# Patient Record
Sex: MALE | Race: WHITE | NOT HISPANIC OR LATINO | Employment: FULL TIME | URBAN - METROPOLITAN AREA
[De-identification: names, ages, dates, MRNs, and addresses within clinical notes are randomized per-mention and may not be internally consistent; named-entity substitution may affect disease eponyms.]

---

## 2024-04-11 LAB — HBA1C MFR BLD HPLC: 4.7 %

## 2024-04-18 LAB — HCV AB SER-ACNC: NORMAL

## 2024-10-24 ENCOUNTER — OFFICE VISIT (OUTPATIENT)
Dept: GASTROENTEROLOGY | Facility: CLINIC | Age: 49
End: 2024-10-24
Payer: COMMERCIAL

## 2024-10-24 ENCOUNTER — TELEPHONE (OUTPATIENT)
Dept: GASTROENTEROLOGY | Facility: CLINIC | Age: 49
End: 2024-10-24

## 2024-10-24 VITALS
WEIGHT: 263.8 LBS | SYSTOLIC BLOOD PRESSURE: 134 MMHG | TEMPERATURE: 98.1 F | OXYGEN SATURATION: 97 % | HEART RATE: 80 BPM | DIASTOLIC BLOOD PRESSURE: 85 MMHG | HEIGHT: 70 IN | BODY MASS INDEX: 37.77 KG/M2

## 2024-10-24 DIAGNOSIS — Z12.11 SCREENING FOR COLON CANCER: ICD-10-CM

## 2024-10-24 DIAGNOSIS — B25.9 CMV INFECTION, ACUTE (HCC): ICD-10-CM

## 2024-10-24 DIAGNOSIS — R79.89 ELEVATED LFTS: Primary | ICD-10-CM

## 2024-10-24 PROCEDURE — 99203 OFFICE O/P NEW LOW 30 MIN: CPT | Performed by: INTERNAL MEDICINE

## 2024-10-24 RX ORDER — ALBUTEROL SULFATE 90 UG/1
2 INHALANT RESPIRATORY (INHALATION) EVERY 6 HOURS PRN
COMMUNITY

## 2024-10-24 RX ORDER — POLYETHYLENE GLYCOL 3350, SODIUM CHLORIDE, SODIUM BICARBONATE, POTASSIUM CHLORIDE 420; 11.2; 5.72; 1.48 G/4L; G/4L; G/4L; G/4L
4000 POWDER, FOR SOLUTION ORAL ONCE
Qty: 4000 ML | Refills: 0 | Status: SHIPPED | OUTPATIENT
Start: 2024-10-24 | End: 2024-10-24

## 2024-10-24 NOTE — TELEPHONE ENCOUNTER
[phone] was not the correct number, I efraín Ramirez and faxed the paper work to [phone]

## 2024-10-24 NOTE — TELEPHONE ENCOUNTER
Scheduled date of colonoscopy (as of today):12/10/24  Physician performing colonoscopy:Trevor  Location of colonoscopy:[facility]  Bowel prep reviewed with patient:Angie  Instructions reviewed with patient by:verbal and folder   Clearances: NONE

## 2024-10-24 NOTE — PROGRESS NOTES
[facility] - Outpatient Consultation  Mike Warner 48 y.o. male MRN: [id number]  Encounter: [id number]    PCP:  Ricki Ibanez DO, [phone]  Referring Provider:  No ref. provider found,         ASSESSMENT AND PLAN:      Elevated LFTs:  Hepatocellular with peak  and AST 58 on his most recent labs drawn 2 days ago.  He was found to have acute CMV infection in May when the LFTs were first noticed to be elevated and they have continued to fluctuate since that time.  Repeat CMV serology showed IgG positivity and IgM negativity.  CMV PCR still pending.  Etiology of his transaminase elevation is not quite clear, but the differential includes metabolic dysfunction associated fatty liver disease although his only metabolic risk factor is obesity with a BMI of 37.8.  I have requested serologic workup for underlying causes of transaminase elevation including viral and autoimmune hepatitis, hemochromatosis, celiac disease, Lv's disease and alpha-1 antitrypsin deficiency.  Fortunately, he has no physical or laboratory evidence of advanced liver disease, cirrhosis or portal hypertension.  He has not had abdominal imaging today and I have requested abdominal ultrasound and elastography for further evaluation.  We did discuss that if we do not find a clear cause of his elevated transaminases and they continue to be elevated and/or continue to rise, we may need to pursue a liver biopsy for histologic evaluation.    2.  Colon cancer screening:  Patient has not yet had CRC screening.  He denies a family history of colon cancer and no personal history of constipation.  BMI less than 40.  Will plan for colonoscopy for regular GoLytely prep at [facility] or [facility].    FOLLOW-UP:  Return in about 3 months (around 1/24/2025).    VISIT DIAGNOSES AND ORDERS:      1. Elevated LFTs    2. CMV infection, acute (HCC)    3. Screening for colon cancer      Orders Placed This Encounter    Procedures    US abdomen complete    US elastography/UGAP    Ceruloplasmin    Alpha-1-antitrypsin    Antimitochondrial antibody    Anti-smooth muscle antibody, IgG    DAVID Screen w/ Reflex to Titer/Pattern    Hepatitis A antibody, total    Hepatitis B surface antibody    Hepatitis panel, acute    Alpha 1 Antitrypsin Phenotype    enhanced liver fibrosis (elf) score    CBC    Comprehensive metabolic panel    Protime-INR    IgG    IgA    IgM    Celiac Disease Comprehensive Panel    Iron, TIBC and Ferritin Panel    Colonoscopy     ______________________________________________________________________    HPI:  Mr. Mike Warner is a 48 y.o. male with medical history of lumbar disc disease who is doing fairly well until May of this year when he was found to have acute CMV infection and elevated LFTs and hepatocellular pattern with transaminases 2-4 times the upper limit of normal.    Given acute CMV infection he was monitored with serial blood work and fortunately transaminases did not improve and in fact on his most recent draw 2 days ago his ALT continues to rise, currently 107.  AST currently 58.    Patient states several years ago he was found to have mildly elevated transaminases as part of his life insurance policy blood work.    Mr. Warner denies recent or history of yellow eyes/skin, dark urine, GI bleeding, abdominal distention with fluid, lower extremity swelling, easy bruising, excessive bleeding, pruritus or confusion.  he denies abdominal pain, nausea, vomiting, heartburn, reflux, difficulty swallowing, early satiety, bloating, diarrhea, constipation or straining with passing stools.    He states he occasionally takes an NSAID for back pain, drinks a glass of wine 2-3 times per week, and is otherwise fairly healthy.  He has gained weight recently as he has not been able to run due to his back injury.    REVIEW OF SYSTEMS:    Review of Systems per HPI      Historical Information   There is no problem list  "on file for this patient.    Past Medical History:   Diagnosis Date    Asthma     DJD (degenerative joint disease)     History of CMV      History reviewed. No pertinent surgical history.  Social History   Social History     Substance and Sexual Activity   Alcohol Use Yes    Alcohol/week: 3.0 standard drinks of alcohol    Types: 3 Glasses of wine per week     Social History     Substance and Sexual Activity   Drug Use Not on file     Social History     Tobacco Use   Smoking Status Never   Smokeless Tobacco Never     Family History   Problem Relation Age of Onset    Cancer Mother     No Known Problems Brother     Colon cancer Neg Hx     Colon polyps Neg Hx        Meds/Allergies       Current Outpatient Medications:     albuterol (PROVENTIL HFA,VENTOLIN HFA) 90 mcg/act inhaler    polyethylene glycol-electrolytes (TriLyte) 4000 mL solution    Allergies   Allergen Reactions    Aspirin Hives           Objective     Blood pressure 134/85, pulse 80, temperature 98.1 °F (36.7 °C), temperature source Oral, height 5' 10\" (1.778 m), weight 120 kg (263 lb 12.8 oz), SpO2 97%. Body mass index is 37.85 kg/m².        PHYSICAL EXAM:           Physical Exam  Vitals reviewed.   Constitutional:       General: He is not in acute distress.     Appearance: Normal appearance. He is obese. He is not ill-appearing.   HENT:      Head: Normocephalic and atraumatic.      Nose: Nose normal.      Mouth/Throat:      Pharynx: Oropharynx is clear. No posterior oropharyngeal erythema.   Eyes:      General: No scleral icterus.     Extraocular Movements: Extraocular movements intact.   Cardiovascular:      Rate and Rhythm: Normal rate and regular rhythm.      Heart sounds: No murmur heard.  Pulmonary:      Effort: Pulmonary effort is normal. No respiratory distress.      Breath sounds: Normal breath sounds. No rales.   Abdominal:      General: There is distension (Mild abdominal obesity).      Palpations: Abdomen is soft. There is no shifting dullness, " "fluid wave, hepatomegaly or splenomegaly.      Tenderness: There is no abdominal tenderness. There is no guarding.   Musculoskeletal:         General: No swelling. Normal range of motion.      Cervical back: Normal range of motion and neck supple.   Skin:     General: Skin is warm.      Coloration: Skin is not jaundiced.   Neurological:      General: No focal deficit present.      Mental Status: He is oriented to person, place, and time.   Psychiatric:         Mood and Affect: Mood normal.              Lab Results:   No results found for: \"SODIUM\", \"K\", \"BUN\", \"CREATININE\", \"MG\", \"TBILI\", \"ALKPHOS\", \"ALB\", \"AST\", \"ALT\", \"WBC\", \"PLT\", \"HGB\", \"PT\", \"INR\"  No results found for: \"AFP\"  Lab Results   Component Value Date    HEPCAB NON-REACTIVE 04/18/2024     Lab Results   Component Value Date    HGBA1C 4.7 04/11/2024       Radiology Results:   I have reviewed the results of any radiology studies performed within the last 90 days. This includes:      No results found.      Huseyin Pastrana MD  Hepatology/Gastroenterology  "

## 2024-11-04 ENCOUNTER — TELEPHONE (OUTPATIENT)
Age: 49
End: 2024-11-04

## 2024-11-04 NOTE — TELEPHONE ENCOUNTER
Spoke with pt states he did blood work today and the lab was able to figure the code out to complete the labs. Pt states everything was good.

## 2024-11-04 NOTE — TELEPHONE ENCOUNTER
Lab called in stating both diagnosis codes were not working for HEP A test. While on the line with a nurse to get another diagnosis code, Lab hung up. Please advise of any other diagnosis codes to use.

## 2024-11-12 LAB
A1AT PHENOTYP SERPL-IMP: NORMAL
A1AT SERPL-MCNC: 142 MG/DL (ref 83–199)
ALBUMIN SERPL-MCNC: 4.7 G/DL (ref 3.6–5.1)
ALBUMIN/GLOB SERPL: 1.9 (CALC) (ref 1–2.5)
ALP SERPL-CCNC: 72 U/L (ref 36–130)
ALT SERPL-CCNC: 92 U/L (ref 9–46)
ANA SER QL IF: NEGATIVE
AST SERPL-CCNC: 51 U/L (ref 10–40)
BILIRUB SERPL-MCNC: 0.6 MG/DL (ref 0.2–1.2)
BUN SERPL-MCNC: 19 MG/DL (ref 7–25)
BUN/CREAT SERPL: ABNORMAL (CALC) (ref 6–22)
CALCIUM SERPL-MCNC: 9 MG/DL (ref 8.6–10.3)
CERULOPLASMIN SERPL-MCNC: 22 MG/DL (ref 14–30)
CHLORIDE SERPL-SCNC: 104 MMOL/L (ref 98–110)
CO2 SERPL-SCNC: 26 MMOL/L (ref 20–32)
CREAT SERPL-MCNC: 0.81 MG/DL (ref 0.6–1.29)
ENHANCED LIVER FIBROSIS (ELF) SCORE: 9.48
ERYTHROCYTE [DISTWIDTH] IN BLOOD BY AUTOMATED COUNT: 13 % (ref 11–15)
FERRITIN SERPL-MCNC: 450 NG/ML (ref 38–380)
GFR/BSA.PRED SERPLBLD CYS-BASED-ARV: 110 ML/MIN/1.73M2
GLOBULIN SER CALC-MCNC: 2.5 G/DL (CALC) (ref 1.9–3.7)
GLUCOSE SERPL-MCNC: 95 MG/DL (ref 65–99)
HAV AB SER QL IA: NORMAL
HAV IGM SERPL QL IA: NORMAL
HBV CORE IGM SERPL QL IA: NORMAL
HBV SURFACE AB SERPL IA-ACNC: <5 MIU/ML
HBV SURFACE AG SERPL QL IA: NORMAL
HCT VFR BLD AUTO: 46.9 % (ref 38.5–50)
HCV AB SERPL QL IA: NORMAL
HGB BLD-MCNC: 16 G/DL (ref 13.2–17.1)
IGA SERPL-MCNC: 233 MG/DL (ref 47–310)
IGG SERPL-MCNC: 1249 MG/DL (ref 600–1640)
IGM SERPL-MCNC: 89 MG/DL (ref 50–300)
INR PPP: 1
IRON SATN MFR SERPL: 31 % (CALC) (ref 20–48)
IRON SERPL-MCNC: 106 MCG/DL (ref 50–180)
MCH RBC QN AUTO: 31.5 PG (ref 27–33)
MCHC RBC AUTO-ENTMCNC: 34.1 G/DL (ref 32–36)
MCV RBC AUTO: 92.3 FL (ref 80–100)
MITOCHONDRIA AB SER QL IF: NEGATIVE
PLATELET # BLD AUTO: 239 THOUSAND/UL (ref 140–400)
PMV BLD REES-ECKER: 9.8 FL (ref 7.5–12.5)
POTASSIUM SERPL-SCNC: 4.3 MMOL/L (ref 3.5–5.3)
PROT SERPL-MCNC: 7.2 G/DL (ref 6.1–8.1)
PROTHROMBIN TIME: 10.7 SEC (ref 9–11.5)
RBC # BLD AUTO: 5.08 MILLION/UL (ref 4.2–5.8)
SMOOTH MUSCLE AB SER QL IF: NEGATIVE
SODIUM SERPL-SCNC: 138 MMOL/L (ref 135–146)
TIBC SERPL-MCNC: 340 MCG/DL (CALC) (ref 250–425)
TTG IGA SER-ACNC: <1 U/ML
WBC # BLD AUTO: 6.5 THOUSAND/UL (ref 3.8–10.8)

## 2024-11-18 ENCOUNTER — RESULTS FOLLOW-UP (OUTPATIENT)
Dept: GASTROENTEROLOGY | Facility: CLINIC | Age: 49
End: 2024-11-18

## 2024-11-19 ENCOUNTER — HOSPITAL ENCOUNTER (OUTPATIENT)
Dept: ULTRASOUND IMAGING | Facility: HOSPITAL | Age: 49
Discharge: HOME | End: 2024-11-19
Attending: INTERNAL MEDICINE
Payer: COMMERCIAL

## 2024-11-19 DIAGNOSIS — R79.89 ELEVATED LFTS: ICD-10-CM

## 2024-11-19 DIAGNOSIS — B25.9 CMV INFECTION, ACUTE (HCC): ICD-10-CM

## 2024-11-19 PROCEDURE — 76700 US EXAM ABDOM COMPLETE: CPT

## 2024-11-19 PROCEDURE — 76981 USE PARENCHYMA: CPT

## 2024-12-09 ENCOUNTER — TELEPHONE (OUTPATIENT)
Dept: SURGERY | Facility: HOSPITAL | Age: 49
End: 2024-12-09

## 2024-12-09 NOTE — TELEPHONE ENCOUNTER
Patient's wife called in to cancel due to patient having Covid. Tried to contact [facility] to informed them of cancellation and was not able to get a hold of anyone.

## 2025-05-05 ENCOUNTER — ANESTHESIA (OUTPATIENT)
Dept: ANESTHESIOLOGY | Facility: AMBULATORY SURGERY CENTER | Age: 50
End: 2025-05-05

## 2025-05-05 ENCOUNTER — TELEPHONE (OUTPATIENT)
Age: 50
End: 2025-05-05

## 2025-05-05 ENCOUNTER — ANESTHESIA EVENT (OUTPATIENT)
Dept: ANESTHESIOLOGY | Facility: AMBULATORY SURGERY CENTER | Age: 50
End: 2025-05-05

## 2025-05-05 NOTE — TELEPHONE ENCOUNTER
Scheduled date of colonoscopy (as of today):5/19/25  Physician performing colonoscopy:Dr Lew  Location of colonoscopy:[facility]  Bowel prep reviewed with patient: reviewed with pt wife and sent golytely prep instructions to patient's email   [email or web address]     Instructions reviewed with patient by:connor  Clearances: na    Pt is on zepbound will hold for 7 days.  Requesting to be scheduled at [facility],

## 2025-05-05 NOTE — TELEPHONE ENCOUNTER
05/05/25  Screened by: Lin Baldwin    Referring Provider     Pre- Screening:     There is no height or weight on file to calculate BMI. 37.85  Has patient been referred for a routine screening Colonoscopy? yes  Is the patient between 45-75 years old? yes      Previous Colonoscopy no   If yes:    Date:     Facility:     Reason:           Does the patient want to see a Gastroenterologist prior to their procedure OR are they having any GI symptoms? no    Has the patient been hospitalized or had abdominal surgery in the past 6 months? no    Does the patient use supplemental oxygen? no    Does the patient take Coumadin, Lovenox, Plavix, Elliquis, Xarelto, or other blood thinning medication? no    Has the patient had a stroke, cardiac event, or stent placed in the past year? no        If patient is between 45yrs - 49yrs, please advise patient that we will have to confirm benefits & coverage with their insurance company for a routine screening colonoscopy.

## 2025-05-15 ENCOUNTER — TELEPHONE (OUTPATIENT)
Dept: GASTROENTEROLOGY | Facility: AMBULATORY SURGERY CENTER | Age: 50
End: 2025-05-15

## 2025-05-27 ENCOUNTER — ANESTHESIA (OUTPATIENT)
Dept: ANESTHESIOLOGY | Facility: AMBULATORY SURGERY CENTER | Age: 50
End: 2025-05-27

## 2025-05-27 ENCOUNTER — ANESTHESIA EVENT (OUTPATIENT)
Dept: ANESTHESIOLOGY | Facility: AMBULATORY SURGERY CENTER | Age: 50
End: 2025-05-27

## 2025-06-12 ENCOUNTER — ANESTHESIA (OUTPATIENT)
Dept: ANESTHESIOLOGY | Facility: AMBULATORY SURGERY CENTER | Age: 50
End: 2025-06-12

## 2025-06-12 ENCOUNTER — ANESTHESIA EVENT (OUTPATIENT)
Dept: ANESTHESIOLOGY | Facility: AMBULATORY SURGERY CENTER | Age: 50
End: 2025-06-12

## 2025-06-19 ENCOUNTER — ANESTHESIA (OUTPATIENT)
Dept: GASTROENTEROLOGY | Facility: AMBULATORY SURGERY CENTER | Age: 50
End: 2025-06-19

## 2025-06-19 ENCOUNTER — HOSPITAL ENCOUNTER (OUTPATIENT)
Dept: GASTROENTEROLOGY | Facility: AMBULATORY SURGERY CENTER | Age: 50
Discharge: HOME | End: 2025-06-19
Attending: INTERNAL MEDICINE
Payer: COMMERCIAL

## 2025-06-19 ENCOUNTER — ANESTHESIA EVENT (OUTPATIENT)
Dept: GASTROENTEROLOGY | Facility: AMBULATORY SURGERY CENTER | Age: 50
End: 2025-06-19

## 2025-06-19 VITALS
HEIGHT: 70 IN | WEIGHT: 263 LBS | TEMPERATURE: 97.6 F | SYSTOLIC BLOOD PRESSURE: 125 MMHG | RESPIRATION RATE: 20 BRPM | DIASTOLIC BLOOD PRESSURE: 72 MMHG | BODY MASS INDEX: 37.65 KG/M2 | HEART RATE: 72 BPM | OXYGEN SATURATION: 93 %

## 2025-06-19 DIAGNOSIS — Z12.11 SCREENING FOR COLON CANCER: ICD-10-CM

## 2025-06-19 PROCEDURE — G0121 COLON CA SCRN NOT HI RSK IND: HCPCS | Performed by: INTERNAL MEDICINE

## 2025-06-19 RX ORDER — LIDOCAINE HYDROCHLORIDE 10 MG/ML
INJECTION, SOLUTION EPIDURAL; INFILTRATION; INTRACAUDAL; PERINEURAL AS NEEDED
Status: DISCONTINUED | OUTPATIENT
Start: 2025-06-19 | End: 2025-06-19

## 2025-06-19 RX ORDER — SODIUM CHLORIDE, SODIUM LACTATE, POTASSIUM CHLORIDE, CALCIUM CHLORIDE 600; 310; 30; 20 MG/100ML; MG/100ML; MG/100ML; MG/100ML
50 INJECTION, SOLUTION INTRAVENOUS CONTINUOUS
Status: DISCONTINUED | OUTPATIENT
Start: 2025-06-19 | End: 2025-06-23 | Stop reason: HOSPADM

## 2025-06-19 RX ORDER — PROPOFOL 10 MG/ML
INJECTION, EMULSION INTRAVENOUS AS NEEDED
Status: DISCONTINUED | OUTPATIENT
Start: 2025-06-19 | End: 2025-06-19

## 2025-06-19 RX ADMIN — PROPOFOL 100 MG: 10 INJECTION, EMULSION INTRAVENOUS at 09:18

## 2025-06-19 RX ADMIN — PROPOFOL 50 MG: 10 INJECTION, EMULSION INTRAVENOUS at 09:24

## 2025-06-19 RX ADMIN — SODIUM CHLORIDE, SODIUM LACTATE, POTASSIUM CHLORIDE, CALCIUM CHLORIDE 50 ML/HR: 600; 310; 30; 20 INJECTION, SOLUTION INTRAVENOUS at 08:26

## 2025-06-19 RX ADMIN — PROPOFOL 50 MG: 10 INJECTION, EMULSION INTRAVENOUS at 09:31

## 2025-06-19 RX ADMIN — LIDOCAINE HYDROCHLORIDE 50 MG: 10 INJECTION, SOLUTION EPIDURAL; INFILTRATION; INTRACAUDAL; PERINEURAL at 09:11

## 2025-06-19 RX ADMIN — PROPOFOL 100 MG: 10 INJECTION, EMULSION INTRAVENOUS at 09:21

## 2025-06-19 RX ADMIN — PROPOFOL 30 MG: 10 INJECTION, EMULSION INTRAVENOUS at 09:28

## 2025-06-19 RX ADMIN — PROPOFOL 20 MG: 10 INJECTION, EMULSION INTRAVENOUS at 09:29

## 2025-06-19 RX ADMIN — PROPOFOL 20 MG: 10 INJECTION, EMULSION INTRAVENOUS at 09:35

## 2025-06-19 RX ADMIN — PROPOFOL 30 MG: 10 INJECTION, EMULSION INTRAVENOUS at 09:33

## 2025-06-19 NOTE — ANESTHESIA PREPROCEDURE EVALUATION
Procedure:  COLONOSCOPY    Relevant Problems   PULMONARY   (+) Asthma        Physical Exam    Airway     Mallampati score: II  TM Distance: >3 FB  Neck ROM: full      Cardiovascular      Dental   No notable dental hx     Pulmonary      Neurological  - normal exam    Other Findings        Anesthesia Plan  ASA Score- 2     Anesthesia Type- IV sedation with anesthesia with ASA Monitors.         Additional Monitors:     Airway Plan:            Plan Factors-    Chart reviewed.    Patient summary reviewed.    Patient is not a current smoker.              Induction-     Postoperative Plan- .   Monitoring Plan - Monitoring plan - standard ASA monitoring          Informed Consent- Anesthetic plan and risks discussed with patient.  I personally reviewed this patient with the CRNA. Discussed and agreed on the Anesthesia Plan with the CRNA..      NPO Status:  Vitals Value Taken Time   Date of last liquid 06/19/25 06/19/25 08:12   Time of last liquid 0330 06/19/25 08:12   Date of last solid 06/17/25 06/19/25 08:12   Time of last solid 1800 06/19/25 08:12

## 2025-06-19 NOTE — ANESTHESIA POSTPROCEDURE EVALUATION
Post-Op Assessment Note    CV Status:  Stable  Pain Score: 0    Pain management: adequate       Mental Status:  Alert and awake   Hydration Status:  Euvolemic   PONV Controlled:  Controlled   Airway Patency:  Patent     Post Op Vitals Reviewed: Yes    No anethesia notable event occurred.    Staff: Anesthesiologist, with CRNAs           Last Filed PACU Vitals:  Vitals Value Taken Time   Temp     Pulse     BP     Resp     SpO2